# Patient Record
Sex: FEMALE | Race: WHITE
[De-identification: names, ages, dates, MRNs, and addresses within clinical notes are randomized per-mention and may not be internally consistent; named-entity substitution may affect disease eponyms.]

---

## 2018-04-05 ENCOUNTER — HOSPITAL ENCOUNTER (OUTPATIENT)
Dept: HOSPITAL 17 - HRSP | Age: 63
End: 2018-04-05
Attending: INTERNAL MEDICINE
Payer: MEDICARE

## 2018-04-05 DIAGNOSIS — R06.02: Primary | ICD-10-CM

## 2018-04-05 PROCEDURE — 94726 PLETHYSMOGRAPHY LUNG VOLUMES: CPT

## 2018-04-05 PROCEDURE — 94060 EVALUATION OF WHEEZING: CPT

## 2018-04-05 PROCEDURE — 94729 DIFFUSING CAPACITY: CPT

## 2018-04-09 NOTE — RSPPFT
DATE OF PROCEDURE:   4/5/18



COMMENTS:   



Spirometry with FVC of 2.5, FEV1 of 1.6, FEV1/FVC ratio at 63% of predicted. Slow vital 
capacity is 75% of predicted. TLC at 81%. Diffusion capacity at 74% of predicted and 
normal when corrected for alveolar volume.   



IMPRESSION:    

   

1.    Moderately severe airways obstruction.

2.    No evidence of airways restriction.

3.    Positive and significant response to acutely inhaled bronchodilator.

4.    Reduced diffusion capacity and normal when corrected for alveolar volume.